# Patient Record
(demographics unavailable — no encounter records)

---

## 2025-07-08 NOTE — PROCEDURE

## 2025-07-08 NOTE — ASSESSMENT
[FreeTextEntry1] : Discussed at length with patient underlying arthritis and treatment options and at this time patient elects home exercise PT as well as anti-inflammatory encourage avoid hyperflexion knee past 90 degrees.  Patient elects cortisone injection today and understands that ultimately she may benefit from total knee replacement

## 2025-07-08 NOTE — PHYSICAL EXAM
[de-identified] : Follow-up for new or left knee  Constitutional:  The patient is healthy-appearing and in no apparent distress.   Gait: The patient ambulates with a normal gait and no limp.  Cardiovascular System:  The capillary refill is less than 2 seconds.   Skin:  There are no skin abnormalities.  Left Knee:   Bony Palpation:  There is tenderness of the medial joint line.  There is no tenderness of the lateral joint line. There is no tenderness of the medial femoral chondyle. There is no tenderness of the lateral femoral chondyle. There is no tenderness of the tibial tubercle. There is no tenderness of the superior patella. There is no tenderness of the inferior patella. There is tenderness of the medial patellar facet. There is tenderness of the lateral patellar facet.  Soft Tissue Palpation:  There is no tenderness of the medial retinaculum. There is no tenderness of the lateral retinaculum. There is no tenderness of the quadriceps tendon. There is no tenderness of the patella tendon. There is no tenderness of the ITB. There is no tenderness of the pes anserine.  Active Range of Motion:  The range of motion at the knee actively and passively is 0 - 130.   Special Tests:  There is a negative Apley. There is a negative Steinmanns.  There is a negative Lachman and Anterior Drawer. There is a negative Posterior Drawer.   There is no varus or valgus laxity.  Strength:  There is 5/5 hip flexion and 5/5 knee flexion and extension.    Psychiatric:  The patient demonstrates a normal mood and affect and is active and alert Alignment: There is external tibial rotation and femoral anteversion. [de-identified] : Based on detailed discussion with history and physical examination of the patient, x-ray evaluation is recommended and ordered for treatment and diagnosis. 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

## 2025-07-08 NOTE — HISTORY OF PRESENT ILLNESS
[de-identified] : Initial visit: left knee pain  Reason: had a fall on january  Duration: 7 months Prior studies: xray order  Symptoms throbbing and shooting pain  Aggravating Fx: walking / sitting  Alleviating Fx: rest  Pain level:  Mild to moderate Pain medication: n/ a Medical Hx: pre diabetic  Surgical Hx: n/a  Current Medication: Allergies: pet dander